# Patient Record
Sex: FEMALE | Race: BLACK OR AFRICAN AMERICAN | NOT HISPANIC OR LATINO
[De-identification: names, ages, dates, MRNs, and addresses within clinical notes are randomized per-mention and may not be internally consistent; named-entity substitution may affect disease eponyms.]

---

## 2018-10-25 PROBLEM — Z00.00 ENCOUNTER FOR PREVENTIVE HEALTH EXAMINATION: Status: ACTIVE | Noted: 2018-10-25

## 2018-10-29 ENCOUNTER — NON-APPOINTMENT (OUTPATIENT)
Age: 68
End: 2018-10-29

## 2018-10-29 ENCOUNTER — APPOINTMENT (OUTPATIENT)
Dept: CARDIOLOGY | Facility: CLINIC | Age: 68
End: 2018-10-29
Payer: MEDICARE

## 2018-10-29 VITALS
BODY MASS INDEX: 24.66 KG/M2 | OXYGEN SATURATION: 99 % | WEIGHT: 134 LBS | DIASTOLIC BLOOD PRESSURE: 68 MMHG | HEIGHT: 62 IN | HEART RATE: 73 BPM | SYSTOLIC BLOOD PRESSURE: 118 MMHG

## 2018-10-29 DIAGNOSIS — Z01.810 ENCOUNTER FOR PREPROCEDURAL CARDIOVASCULAR EXAMINATION: ICD-10-CM

## 2018-10-29 DIAGNOSIS — Z87.39 PERSONAL HISTORY OF OTHER DISEASES OF THE MUSCULOSKELETAL SYSTEM AND CONNECTIVE TISSUE: ICD-10-CM

## 2018-10-29 DIAGNOSIS — Z86.79 PERSONAL HISTORY OF OTHER DISEASES OF THE CIRCULATORY SYSTEM: ICD-10-CM

## 2018-10-29 PROCEDURE — 99204 OFFICE O/P NEW MOD 45 MIN: CPT

## 2018-10-29 PROCEDURE — 93000 ELECTROCARDIOGRAM COMPLETE: CPT

## 2018-10-29 PROCEDURE — 93306 TTE W/DOPPLER COMPLETE: CPT

## 2018-11-02 ENCOUNTER — OUTPATIENT (OUTPATIENT)
Dept: OUTPATIENT SERVICES | Facility: HOSPITAL | Age: 68
LOS: 1 days | End: 2018-11-02

## 2018-11-13 ENCOUNTER — OUTPATIENT (OUTPATIENT)
Dept: OUTPATIENT SERVICES | Facility: HOSPITAL | Age: 68
LOS: 1 days | End: 2018-11-13

## 2018-11-13 ENCOUNTER — INPATIENT (INPATIENT)
Facility: HOSPITAL | Age: 68
LOS: 6 days | Discharge: HOME | End: 2018-11-20
Payer: MEDICARE

## 2018-11-13 PROCEDURE — 72170 X-RAY EXAM OF PELVIS: CPT | Mod: 26

## 2018-11-14 ENCOUNTER — OUTPATIENT (OUTPATIENT)
Dept: OUTPATIENT SERVICES | Facility: HOSPITAL | Age: 68
LOS: 1 days | End: 2018-11-14

## 2018-11-15 ENCOUNTER — OUTPATIENT (OUTPATIENT)
Dept: OUTPATIENT SERVICES | Facility: HOSPITAL | Age: 68
LOS: 1 days | End: 2018-11-15

## 2018-11-16 ENCOUNTER — OUTPATIENT (OUTPATIENT)
Dept: OUTPATIENT SERVICES | Facility: HOSPITAL | Age: 68
LOS: 1 days | End: 2018-11-16

## 2018-11-19 ENCOUNTER — OUTPATIENT (OUTPATIENT)
Dept: OUTPATIENT SERVICES | Facility: HOSPITAL | Age: 68
LOS: 1 days | End: 2018-11-19

## 2018-11-29 ENCOUNTER — OUTPATIENT (OUTPATIENT)
Dept: OUTPATIENT SERVICES | Facility: HOSPITAL | Age: 68
LOS: 1 days | End: 2018-11-29
Payer: MEDICARE

## 2018-11-29 PROCEDURE — 93971 EXTREMITY STUDY: CPT | Mod: 26,RT

## 2020-10-26 ENCOUNTER — APPOINTMENT (OUTPATIENT)
Dept: MAMMOGRAPHY | Facility: CLINIC | Age: 70
End: 2020-10-26
Payer: MEDICARE

## 2020-10-26 PROCEDURE — 77063 BREAST TOMOSYNTHESIS BI: CPT

## 2020-10-26 PROCEDURE — 77067 SCR MAMMO BI INCL CAD: CPT

## 2020-10-26 PROCEDURE — 99072 ADDL SUPL MATRL&STAF TM PHE: CPT

## 2021-01-06 ENCOUNTER — APPOINTMENT (OUTPATIENT)
Dept: RADIOLOGY | Facility: CLINIC | Age: 71
End: 2021-01-06
Payer: MEDICARE

## 2021-01-06 PROCEDURE — 77080 DXA BONE DENSITY AXIAL: CPT

## 2021-12-29 ENCOUNTER — APPOINTMENT (OUTPATIENT)
Dept: MAMMOGRAPHY | Facility: CLINIC | Age: 71
End: 2021-12-29
Payer: MEDICARE

## 2021-12-29 PROCEDURE — 77067 SCR MAMMO BI INCL CAD: CPT

## 2021-12-29 PROCEDURE — 77063 BREAST TOMOSYNTHESIS BI: CPT

## 2022-01-21 ENCOUNTER — APPOINTMENT (OUTPATIENT)
Dept: MAMMOGRAPHY | Facility: CLINIC | Age: 72
End: 2022-01-21
Payer: COMMERCIAL

## 2022-01-21 ENCOUNTER — APPOINTMENT (OUTPATIENT)
Dept: ULTRASOUND IMAGING | Facility: CLINIC | Age: 72
End: 2022-01-21

## 2022-01-21 PROCEDURE — G0279: CPT | Mod: RT

## 2022-01-21 PROCEDURE — 77065 DX MAMMO INCL CAD UNI: CPT | Mod: RT

## 2022-01-21 PROCEDURE — 76642 ULTRASOUND BREAST LIMITED: CPT | Mod: RT

## 2022-06-17 ENCOUNTER — APPOINTMENT (OUTPATIENT)
Dept: MAMMOGRAPHY | Facility: CLINIC | Age: 72
End: 2022-06-17
Payer: MEDICARE

## 2022-06-17 ENCOUNTER — APPOINTMENT (OUTPATIENT)
Dept: ULTRASOUND IMAGING | Facility: CLINIC | Age: 72
End: 2022-06-17

## 2022-06-17 PROCEDURE — 77065 DX MAMMO INCL CAD UNI: CPT | Mod: RT

## 2022-06-17 PROCEDURE — 76642 ULTRASOUND BREAST LIMITED: CPT | Mod: RT

## 2022-06-17 PROCEDURE — G0279: CPT | Mod: RT

## 2022-11-03 RX ORDER — PSYLLIUM HUSK 0.4 G
CAPSULE ORAL
Refills: 0 | Status: ACTIVE | COMMUNITY

## 2022-11-03 RX ORDER — UBIDECARENONE/VIT E ACET 100MG-5
1000 CAPSULE ORAL
Refills: 0 | Status: ACTIVE | COMMUNITY

## 2022-11-03 RX ORDER — ASPIRIN ENTERIC COATED TABLETS 81 MG 81 MG/1
81 TABLET, DELAYED RELEASE ORAL DAILY
Qty: 30 | Refills: 0 | Status: ACTIVE | COMMUNITY

## 2022-11-03 RX ORDER — LATANOPROST/PF 0.005 %
0.01 DROPS OPHTHALMIC (EYE) DAILY
Refills: 0 | Status: ACTIVE | COMMUNITY

## 2022-11-03 RX ORDER — MULTIVIT-MIN/FOLIC/VIT K/LYCOP 400-300MCG
1000 TABLET ORAL DAILY
Refills: 0 | Status: ACTIVE | COMMUNITY

## 2022-11-22 ENCOUNTER — OFFICE (OUTPATIENT)
Dept: URBAN - METROPOLITAN AREA CLINIC 38 | Facility: CLINIC | Age: 72
Setting detail: OPHTHALMOLOGY
End: 2022-11-22
Payer: MEDICARE

## 2022-11-22 DIAGNOSIS — H01.001: ICD-10-CM

## 2022-11-22 DIAGNOSIS — H16.223: ICD-10-CM

## 2022-11-22 DIAGNOSIS — H40.1131: ICD-10-CM

## 2022-11-22 DIAGNOSIS — H25.13: ICD-10-CM

## 2022-11-22 DIAGNOSIS — H01.004: ICD-10-CM

## 2022-11-22 PROCEDURE — 99214 OFFICE O/P EST MOD 30 MIN: CPT | Performed by: OPHTHALMOLOGY

## 2022-11-22 ASSESSMENT — KERATOMETRY
OS_K1POWER_DIOPTERS: 42.25
METHOD_AUTO_MANUAL: AUTO
OD_K2POWER_DIOPTERS: 43.25
OS_K2POWER_DIOPTERS: 42.75
OS_AXISANGLE_DEGREES: 155
OD_K1POWER_DIOPTERS: 41.50
OD_AXISANGLE_DEGREES: 011

## 2022-11-22 ASSESSMENT — REFRACTION_CURRENTRX
OS_CYLINDER: -1.75
OS_OVR_VA: 20/
OS_AXIS: 057
OD_OVR_VA: 20/
OS_ADD: +2.00
OS_CYLINDER: -1.25
OD_OVR_VA: 20/
OD_SPHERE: +0.75
OD_CYLINDER: -3.00
OS_SPHERE: +0.75
OD_VPRISM_DIRECTION: PROGS
OS_SPHERE: +1.75
OS_VPRISM_DIRECTION: PROGS
OS_VPRISM_DIRECTION: PROGS
OD_AXIS: 108
OD_ADD: +2.25
OS_ADD: +2.25
OD_SPHERE: +1.00
OS_AXIS: 064
OD_CYLINDER: -2.50
OD_AXIS: 115
OD_ADD: +2.00
OS_OVR_VA: 20/
OD_VPRISM_DIRECTION: PROGS

## 2022-11-22 ASSESSMENT — REFRACTION_AUTOREFRACTION
OD_CYLINDER: -3.50
OS_AXIS: 079
OD_SPHERE: +1.50
OD_AXIS: 097
OS_SPHERE: +1.50
OS_CYLINDER: -1.75

## 2022-11-22 ASSESSMENT — VISUAL ACUITY
OD_BCVA: 20/20-2
OS_BCVA: 20/20

## 2022-11-22 ASSESSMENT — REFRACTION_MANIFEST
OD_VA2: 20/25(J1)
OS_ADD: +2.50
OD_AXIS: 100
OS_CYLINDER: -1.00
OS_SPHERE: +1.00
OU_VA: 20/20
OD_VA1: 20/20-
OD_CYLINDER: -3.00
OD_ADD: +2.50
OS_VA1: 20/25
OS_AXIS: 060
OS_VA2: 20/25(J1)
OD_SPHERE: +0.75

## 2022-11-22 ASSESSMENT — AXIALLENGTH_DERIVED
OS_AL: 23.7164
OS_AL: 23.7657
OD_AL: 24.1138
OD_AL: 24.3191

## 2022-11-22 ASSESSMENT — TONOMETRY
OD_IOP_MMHG: 16
OS_IOP_MMHG: 16

## 2022-11-22 ASSESSMENT — LID EXAM ASSESSMENTS
OS_BLEPHARITIS: T 1+
OD_BLEPHARITIS: T 1+

## 2022-11-22 ASSESSMENT — CONFRONTATIONAL VISUAL FIELD TEST (CVF)
OD_FINDINGS: FULL
OS_FINDINGS: FULL

## 2022-11-22 ASSESSMENT — SPHEQUIV_DERIVED
OD_SPHEQUIV: -0.75
OD_SPHEQUIV: -0.25
OS_SPHEQUIV: 0.5
OS_SPHEQUIV: 0.625

## 2022-11-22 ASSESSMENT — SUPERFICIAL PUNCTATE KERATITIS (SPK)
OS_SPK: T
OD_SPK: T

## 2022-11-30 ENCOUNTER — APPOINTMENT (OUTPATIENT)
Dept: ENDOCRINOLOGY | Facility: CLINIC | Age: 72
End: 2022-11-30

## 2022-11-30 VITALS
SYSTOLIC BLOOD PRESSURE: 108 MMHG | HEIGHT: 62 IN | WEIGHT: 134 LBS | DIASTOLIC BLOOD PRESSURE: 66 MMHG | BODY MASS INDEX: 24.66 KG/M2 | OXYGEN SATURATION: 99 % | HEART RATE: 58 BPM | TEMPERATURE: 98.4 F

## 2022-11-30 DIAGNOSIS — Z87.39 PERSONAL HISTORY OF OTHER DISEASES OF THE MUSCULOSKELETAL SYSTEM AND CONNECTIVE TISSUE: ICD-10-CM

## 2022-11-30 DIAGNOSIS — Z00.00 ENCOUNTER FOR GENERAL ADULT MEDICAL EXAMINATION W/OUT ABNORMAL FINDINGS: ICD-10-CM

## 2022-11-30 DIAGNOSIS — Z86.79 PERSONAL HISTORY OF OTHER DISEASES OF THE CIRCULATORY SYSTEM: ICD-10-CM

## 2022-11-30 DIAGNOSIS — I10 ESSENTIAL (PRIMARY) HYPERTENSION: ICD-10-CM

## 2022-11-30 PROCEDURE — 99213 OFFICE O/P EST LOW 20 MIN: CPT

## 2022-11-30 NOTE — ASSESSMENT
[FreeTextEntry1] : MIDDLE  AGED BF WITH H/X OF OSTEOPOROSIS,OFF PROLIA ,LAST BMD WITH SIG IMPROVEMENT,HTN CONTROLLED ON MED,AND LIPIDS .TOLERATING MED,NO H.X OF RECENT FALL OR  FX. .REC:\par 1.PT TO REPEAT HER COMPLETE BLOOD ANALYSIS.\par 2 .IF STABLE ,WILL CONTINUE CURRENT MED.\par CONTINUE DIET,EXERCISE AND EDWARD SUPPLEMENTS WITH VIT D ON A DAILY BASIS..PLAN D/W THE PT.\par F/U IN 4 MONTHS.

## 2022-11-30 NOTE — PHYSICAL EXAM
[Alert] : alert [Well Nourished] : well nourished [Normal Sclera/Conjunctiva] : normal sclera/conjunctiva [No Neck Mass] : no neck mass was observed [Clear to Auscultation] : lungs were clear to auscultation bilaterally [Normal Rate] : heart rate was normal [Carotids Normal] : carotid pulses were normal with no bruits [Pedal Pulses Normal] : the pedal pulses are present [No Varicosities] : there were no varicosital changes [Not Tender] : non-tender [Not Distended] : not distended [Soft] : abdomen soft [Normal Supraclavicular Nodes] : no supraclavicular lymphadenopathy [Normal Anterior Cervical Nodes] : no anterior cervical lymphadenopathy [Normal Posterior Cervical Nodes] : no posterior cervical lymphadenopathy [No CVA Tenderness] : no ~M costovertebral angle tenderness [No Stigmata of Cushings Syndrome] : no stigmata of Cushings Syndrome [No Rash] : no rash [No Motor Deficits] : the motor exam was normal [Normal Reflexes] : deep tendon reflexes were 2+ and symmetric [No Tremors] : no tremors [Oriented x3] : oriented to person, place, and time [de-identified] : DEFERRRED [FreeTextEntry1] : DEFERRED [de-identified] : DEFERRED

## 2022-11-30 NOTE — HISTORY OF PRESENT ILLNESS
[FreeTextEntry1] : yOUNG FEMALE WITH H/X OF HTN,HLD AND OSTEOPOROSISFOR F/U.NO SIG COMPLAINTS,COMPLIANT WITH DIET AND EXERCISE.ROS IS NEGATIVE.

## 2022-11-30 NOTE — PHYSICAL EXAM
[Alert] : alert [Well Nourished] : well nourished [Normal Sclera/Conjunctiva] : normal sclera/conjunctiva [No Neck Mass] : no neck mass was observed [Clear to Auscultation] : lungs were clear to auscultation bilaterally [Normal Rate] : heart rate was normal [Carotids Normal] : carotid pulses were normal with no bruits [Pedal Pulses Normal] : the pedal pulses are present [No Varicosities] : there were no varicosital changes [Not Tender] : non-tender [Not Distended] : not distended [Soft] : abdomen soft [Normal Supraclavicular Nodes] : no supraclavicular lymphadenopathy [Normal Anterior Cervical Nodes] : no anterior cervical lymphadenopathy [Normal Posterior Cervical Nodes] : no posterior cervical lymphadenopathy [No CVA Tenderness] : no ~M costovertebral angle tenderness [No Stigmata of Cushings Syndrome] : no stigmata of Cushings Syndrome [No Rash] : no rash [No Motor Deficits] : the motor exam was normal [Normal Reflexes] : deep tendon reflexes were 2+ and symmetric [No Tremors] : no tremors [Oriented x3] : oriented to person, place, and time [de-identified] : DEFERRRED [FreeTextEntry1] : DEFERRED [de-identified] : DEFERRED

## 2023-03-22 ENCOUNTER — OFFICE (OUTPATIENT)
Dept: URBAN - METROPOLITAN AREA CLINIC 38 | Facility: CLINIC | Age: 73
Setting detail: OPHTHALMOLOGY
End: 2023-03-22
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
## 2023-03-22 PROCEDURE — NO SHOW FE NO SHOW FEE: Performed by: OPHTHALMOLOGY

## 2023-04-21 PROBLEM — H52.4 PRESBYOPIA ; BOTH EYES: Status: ACTIVE | Noted: 2023-04-21

## 2023-04-24 ENCOUNTER — APPOINTMENT (OUTPATIENT)
Dept: ULTRASOUND IMAGING | Facility: CLINIC | Age: 73
End: 2023-04-24
Payer: MEDICARE

## 2023-04-24 ENCOUNTER — APPOINTMENT (OUTPATIENT)
Dept: ENDOCRINOLOGY | Facility: CLINIC | Age: 73
End: 2023-04-24
Payer: MEDICARE

## 2023-04-24 ENCOUNTER — RESULT REVIEW (OUTPATIENT)
Age: 73
End: 2023-04-24

## 2023-04-24 VITALS
RESPIRATION RATE: 14 BRPM | SYSTOLIC BLOOD PRESSURE: 120 MMHG | DIASTOLIC BLOOD PRESSURE: 72 MMHG | TEMPERATURE: 97.7 F | WEIGHT: 130 LBS | HEIGHT: 62 IN | BODY MASS INDEX: 23.92 KG/M2 | OXYGEN SATURATION: 97 % | HEART RATE: 89 BPM

## 2023-04-24 DIAGNOSIS — R22.1 LOCALIZED SWELLING, MASS AND LUMP, NECK: ICD-10-CM

## 2023-04-24 DIAGNOSIS — M81.8 OTHER OSTEOPOROSIS W/OUT CURRENT PATHOLOGICAL FRACTURE: ICD-10-CM

## 2023-04-24 PROCEDURE — 99214 OFFICE O/P EST MOD 30 MIN: CPT

## 2023-04-24 PROCEDURE — 76536 US EXAM OF HEAD AND NECK: CPT

## 2023-04-24 RX ORDER — AMLODIPINE BESYLATE 5 MG/1
5 TABLET ORAL DAILY
Refills: 0 | Status: DISCONTINUED | COMMUNITY
End: 2023-04-24

## 2023-04-24 RX ORDER — DENOSUMAB 60 MG/ML
60 INJECTION SUBCUTANEOUS
Refills: 0 | Status: DISCONTINUED | COMMUNITY
End: 2023-04-24

## 2023-04-24 RX ORDER — CHROMIUM 200 MCG
TABLET ORAL
Refills: 0 | Status: DISCONTINUED | COMMUNITY
End: 2023-04-24

## 2023-04-24 RX ORDER — ASPIRIN 81 MG
81 TABLET, DELAYED RELEASE (ENTERIC COATED) ORAL DAILY
Refills: 0 | Status: DISCONTINUED | COMMUNITY
End: 2023-04-24

## 2023-04-24 RX ORDER — BISOPROLOL FUMARATE AND HYDROCHLOROTHIAZIDE 5; 6.25 MG/1; MG/1
5-6.25 TABLET, FILM COATED ORAL DAILY
Refills: 0 | Status: DISCONTINUED | COMMUNITY
End: 2023-04-24

## 2023-04-24 RX ORDER — LATANOPROST/PF 0.005 %
0.01 DROPS OPHTHALMIC (EYE)
Refills: 0 | Status: DISCONTINUED | COMMUNITY
End: 2023-04-24

## 2023-04-24 RX ORDER — AMLODIPINE BESYLATE 2.5 MG/1
2.5 TABLET ORAL
Qty: 90 | Refills: 1 | Status: DISCONTINUED | COMMUNITY
End: 2023-04-24

## 2023-04-24 RX ORDER — MULTIVIT-MIN/IRON/FOLIC ACID/K 18-600-40
CAPSULE ORAL
Refills: 0 | Status: DISCONTINUED | COMMUNITY
End: 2023-04-24

## 2023-04-24 RX ORDER — PRAVASTATIN SODIUM 20 MG/1
20 TABLET ORAL DAILY
Refills: 0 | Status: DISCONTINUED | COMMUNITY
End: 2023-04-24

## 2023-04-24 RX ORDER — PSYLLIUM HUSK 0.4 G
CAPSULE ORAL
Refills: 0 | Status: DISCONTINUED | COMMUNITY
End: 2023-04-24

## 2023-04-24 RX ORDER — FOSINOPRIL SODIUM 10 MG/1
10 TABLET ORAL DAILY
Refills: 0 | Status: DISCONTINUED | COMMUNITY
End: 2023-04-24

## 2023-04-24 NOTE — PHYSICAL EXAM
[de-identified] : Right upper pole thyroid mass approximately 2 x 1 cm in size well defined and tender to touch.  No associated local erythema.

## 2023-04-24 NOTE — HISTORY OF PRESENT ILLNESS
[FreeTextEntry1] : 72-year-old -American female with a past medical history of hypertension, hyperlipidemia and osteoporosis presents with a main complaint of acute pain on the right side of the neck close to the thyroid gland.  She claimed that it was firm and tender but without any local discharge.  She has been experiencing difficulty in swallowing but she denies any palpitations chest pains or shortness of breath.  There is no history of any fever or chills.  She denies any recent oropharyngeal infections.

## 2023-05-10 ENCOUNTER — APPOINTMENT (OUTPATIENT)
Dept: RADIOLOGY | Facility: CLINIC | Age: 73
End: 2023-05-10
Payer: MEDICARE

## 2023-05-10 PROCEDURE — 77080 DXA BONE DENSITY AXIAL: CPT

## 2023-05-18 ENCOUNTER — OFFICE (OUTPATIENT)
Dept: URBAN - METROPOLITAN AREA CLINIC 38 | Facility: CLINIC | Age: 73
Setting detail: OPHTHALMOLOGY
End: 2023-05-18
Payer: MEDICARE

## 2023-05-18 DIAGNOSIS — H40.1131: ICD-10-CM

## 2023-05-18 DIAGNOSIS — H25.13: ICD-10-CM

## 2023-05-18 PROCEDURE — 99213 OFFICE O/P EST LOW 20 MIN: CPT | Performed by: OPHTHALMOLOGY

## 2023-05-18 ASSESSMENT — AXIALLENGTH_DERIVED
OD_AL: 24.3191
OD_AL: 24.0631
OS_AL: 23.7685
OS_AL: 23.7192

## 2023-05-18 ASSESSMENT — REFRACTION_CURRENTRX
OS_SPHERE: +1.75
OD_VPRISM_DIRECTION: PROGS
OD_ADD: +2.00
OS_VPRISM_DIRECTION: PROGS
OD_OVR_VA: 20/
OS_CYLINDER: -1.75
OD_SPHERE: +1.00
OD_VPRISM_DIRECTION: PROGS
OS_OVR_VA: 20/
OD_CYLINDER: -2.50
OD_OVR_VA: 20/
OS_AXIS: 057
OD_CYLINDER: -3.00
OS_AXIS: 064
OD_SPHERE: +0.75
OS_ADD: +2.25
OS_SPHERE: +0.75
OS_VPRISM_DIRECTION: PROGS
OS_OVR_VA: 20/
OD_AXIS: 108
OS_CYLINDER: -1.25
OD_ADD: +2.25
OS_ADD: +2.00
OD_AXIS: 115

## 2023-05-18 ASSESSMENT — REFRACTION_MANIFEST
OU_VA: 20/20
OS_CYLINDER: -1.00
OS_VA2: 20/25(J1)
OD_CYLINDER: -3.00
OS_VA1: 20/25
OS_ADD: +2.50
OD_VA1: 20/20-
OD_AXIS: 100
OD_ADD: +2.50
OD_VA2: 20/25(J1)
OD_SPHERE: +0.75
OS_SPHERE: +1.00
OS_AXIS: 060

## 2023-05-18 ASSESSMENT — KERATOMETRY
OD_AXISANGLE_DEGREES: 013
OS_K2POWER_DIOPTERS: 43.00
OS_K1POWER_DIOPTERS: 42.25
OD_K1POWER_DIOPTERS: 41.50
OS_AXISANGLE_DEGREES: 165
OD_K2POWER_DIOPTERS: 43.25
METHOD_AUTO_MANUAL: AUTO

## 2023-05-18 ASSESSMENT — SPHEQUIV_DERIVED
OS_SPHEQUIV: 0.375
OS_SPHEQUIV: 0.5
OD_SPHEQUIV: -0.75
OD_SPHEQUIV: -0.125

## 2023-05-18 ASSESSMENT — CONFRONTATIONAL VISUAL FIELD TEST (CVF)
OS_FINDINGS: FULL
OD_FINDINGS: FULL

## 2023-05-18 ASSESSMENT — REFRACTION_AUTOREFRACTION
OD_CYLINDER: -3.25
OS_SPHERE: +1.25
OD_SPHERE: +1.50
OS_AXIS: 079
OD_AXIS: 097
OS_CYLINDER: -1.75

## 2023-05-18 ASSESSMENT — VISUAL ACUITY
OD_BCVA: 20/30-1
OS_BCVA: 20/20

## 2023-05-18 ASSESSMENT — TONOMETRY
OD_IOP_MMHG: 16
OS_IOP_MMHG: 16

## 2023-10-13 PROBLEM — H52.4 PRESBYOPIA ; BOTH EYES: Status: ACTIVE | Noted: 2023-10-13

## 2023-11-16 ENCOUNTER — OFFICE (OUTPATIENT)
Dept: URBAN - METROPOLITAN AREA CLINIC 38 | Facility: CLINIC | Age: 73
Setting detail: OPHTHALMOLOGY
End: 2023-11-16
Payer: MEDICARE

## 2023-11-16 DIAGNOSIS — H43.393: ICD-10-CM

## 2023-11-16 DIAGNOSIS — H52.4: ICD-10-CM

## 2023-11-16 DIAGNOSIS — H40.1131: ICD-10-CM

## 2023-11-16 DIAGNOSIS — H25.13: ICD-10-CM

## 2023-11-16 DIAGNOSIS — H43.813: ICD-10-CM

## 2023-11-16 PROCEDURE — 92083 EXTENDED VISUAL FIELD XM: CPT | Performed by: OPHTHALMOLOGY

## 2023-11-16 PROCEDURE — 92014 COMPRE OPH EXAM EST PT 1/>: CPT | Performed by: OPHTHALMOLOGY

## 2023-11-16 PROCEDURE — 92015 DETERMINE REFRACTIVE STATE: CPT | Performed by: OPHTHALMOLOGY

## 2023-11-16 PROCEDURE — 92133 CPTRZD OPH DX IMG PST SGM ON: CPT | Performed by: OPHTHALMOLOGY

## 2023-11-16 ASSESSMENT — REFRACTION_CURRENTRX
OD_VPRISM_DIRECTION: PROGS
OD_SPHERE: +0.75
OS_VPRISM_DIRECTION: PROGS
OS_AXIS: 076
OS_OVR_VA: 20/
OS_AXIS: 064
OD_OVR_VA: 20/
OD_AXIS: 115
OS_SPHERE: +0.50
OD_VPRISM_DIRECTION: PROGS
OD_CYLINDER: -2.50
OD_CYLINDER: -3.00
OS_OVR_VA: 20/
OD_SPHERE: +1.00
OS_CYLINDER: -1.00
OS_ADD: +2.50
OS_CYLINDER: -1.75
OS_ADD: +2.00
OS_SPHERE: +1.75
OS_VPRISM_DIRECTION: PROGS
OD_ADD: +2.00
OD_AXIS: 111
OD_ADD: +2.50
OD_OVR_VA: 20/

## 2023-11-16 ASSESSMENT — REFRACTION_AUTOREFRACTION
OS_AXIS: 078
OS_SPHERE: +1.25
OD_AXIS: 095
OD_CYLINDER: -3.00
OD_SPHERE: +1.00
OS_CYLINDER: -1.50

## 2023-11-16 ASSESSMENT — REFRACTION_MANIFEST
OD_VA1: 20/20-2
OD_ADD: +2.50
OS_CYLINDER: -1.00
OD_AXIS: 095
OS_SPHERE: +0.75
OS_VA1: 20/20-2
OD_AXIS: 095
OS_AXIS: 080
OS_SPHERE: +0.75
OS_VA2: 20/20(J1+)
OU_VA: 20/20-
OD_VA1: 20/20-2
OU_VA: 20/20-
OD_ADD: +2.50
OD_VA2: 20/20(J1+)
OS_ADD: +2.50
OS_CYLINDER: -1.00
OS_VA2: 20/20(J1+)
OD_CYLINDER: -2.25
OD_VA2: 20/20(J1+)
OS_AXIS: 080
OS_ADD: +2.50
OS_VA1: 20/20-2
OD_CYLINDER: -2.25
OD_SPHERE: +0.25
OD_SPHERE: +0.25

## 2023-11-16 ASSESSMENT — SPHEQUIV_DERIVED
OS_SPHEQUIV: 0.25
OD_SPHEQUIV: -0.875
OS_SPHEQUIV: 0.5
OD_SPHEQUIV: -0.5
OS_SPHEQUIV: 0.25
OD_SPHEQUIV: -0.875

## 2023-11-16 ASSESSMENT — CONFRONTATIONAL VISUAL FIELD TEST (CVF)
OD_FINDINGS: FULL
OS_FINDINGS: FULL

## 2023-12-21 ENCOUNTER — APPOINTMENT (OUTPATIENT)
Dept: MAMMOGRAPHY | Facility: CLINIC | Age: 73
End: 2023-12-21
Payer: MEDICARE

## 2023-12-21 ENCOUNTER — APPOINTMENT (OUTPATIENT)
Dept: ULTRASOUND IMAGING | Facility: CLINIC | Age: 73
End: 2023-12-21

## 2023-12-21 PROCEDURE — 76641 ULTRASOUND BREAST COMPLETE: CPT | Mod: 50

## 2023-12-21 PROCEDURE — 77063 BREAST TOMOSYNTHESIS BI: CPT

## 2023-12-21 PROCEDURE — 77067 SCR MAMMO BI INCL CAD: CPT

## 2024-01-15 ENCOUNTER — APPOINTMENT (OUTPATIENT)
Dept: ENDOCRINOLOGY | Facility: CLINIC | Age: 74
End: 2024-01-15

## 2024-04-19 PROBLEM — H52.4 PRESBYOPIA ; BOTH EYES: Status: ACTIVE | Noted: 2024-04-19

## 2024-05-16 ENCOUNTER — OFFICE (OUTPATIENT)
Dept: URBAN - METROPOLITAN AREA CLINIC 38 | Facility: CLINIC | Age: 74
Setting detail: OPHTHALMOLOGY
End: 2024-05-16
Payer: MEDICARE

## 2024-05-16 DIAGNOSIS — H25.13: ICD-10-CM

## 2024-05-16 DIAGNOSIS — H43.393: ICD-10-CM

## 2024-05-16 DIAGNOSIS — H40.1131: ICD-10-CM

## 2024-05-16 DIAGNOSIS — H43.813: ICD-10-CM

## 2024-05-16 PROCEDURE — 99213 OFFICE O/P EST LOW 20 MIN: CPT | Performed by: OPHTHALMOLOGY

## 2024-05-16 ASSESSMENT — CONFRONTATIONAL VISUAL FIELD TEST (CVF)
OD_FINDINGS: FULL
OS_FINDINGS: FULL

## 2024-06-20 ENCOUNTER — APPOINTMENT (OUTPATIENT)
Dept: ULTRASOUND IMAGING | Facility: CLINIC | Age: 74
End: 2024-06-20
Payer: MEDICARE

## 2024-06-20 PROCEDURE — 93880 EXTRACRANIAL BILAT STUDY: CPT

## 2024-06-25 ENCOUNTER — APPOINTMENT (OUTPATIENT)
Dept: CARDIOLOGY | Facility: CLINIC | Age: 74
End: 2024-06-25
Payer: COMMERCIAL

## 2024-06-25 VITALS
HEART RATE: 67 BPM | DIASTOLIC BLOOD PRESSURE: 72 MMHG | OXYGEN SATURATION: 100 % | HEIGHT: 62 IN | BODY MASS INDEX: 24.66 KG/M2 | SYSTOLIC BLOOD PRESSURE: 130 MMHG | WEIGHT: 134 LBS

## 2024-06-25 VITALS — SYSTOLIC BLOOD PRESSURE: 128 MMHG | DIASTOLIC BLOOD PRESSURE: 68 MMHG

## 2024-06-25 DIAGNOSIS — I10 ESSENTIAL (PRIMARY) HYPERTENSION: ICD-10-CM

## 2024-06-25 DIAGNOSIS — R73.9 HYPERGLYCEMIA, UNSPECIFIED: ICD-10-CM

## 2024-06-25 DIAGNOSIS — E78.5 HYPERLIPIDEMIA, UNSPECIFIED: ICD-10-CM

## 2024-06-25 DIAGNOSIS — R53.83 OTHER MALAISE: ICD-10-CM

## 2024-06-25 DIAGNOSIS — R53.81 OTHER MALAISE: ICD-10-CM

## 2024-06-25 PROCEDURE — 93000 ELECTROCARDIOGRAM COMPLETE: CPT

## 2024-06-25 PROCEDURE — 99203 OFFICE O/P NEW LOW 30 MIN: CPT

## 2024-06-25 RX ORDER — EZETIMIBE 10 MG/1
10 TABLET ORAL DAILY
Refills: 0 | Status: ACTIVE | COMMUNITY

## 2024-06-25 RX ORDER — CARVEDILOL 12.5 MG/1
12.5 TABLET, FILM COATED ORAL TWICE DAILY
Refills: 0 | Status: ACTIVE | COMMUNITY

## 2024-06-25 RX ORDER — CHLORHEXIDINE GLUCONATE 4 %
LIQUID (ML) TOPICAL DAILY
Refills: 0 | Status: ACTIVE | COMMUNITY

## 2024-06-25 RX ORDER — BISOPROLOL FUMARATE AND HYDROCHLOROTHIAZIDE 5; 6.25 MG/1; MG/1
5-6.25 TABLET, FILM COATED ORAL DAILY
Qty: 90 | Refills: 3 | Status: DISCONTINUED | COMMUNITY
Start: 2022-11-03 | End: 2024-06-25

## 2024-06-25 RX ORDER — HYDROCHLOROTHIAZIDE 25 MG/1
25 TABLET ORAL DAILY
Refills: 0 | Status: ACTIVE | COMMUNITY

## 2024-06-25 RX ORDER — PRAVASTATIN SODIUM 40 MG/1
40 TABLET ORAL DAILY
Qty: 3 | Refills: 3 | Status: DISCONTINUED | COMMUNITY
Start: 2023-06-19 | End: 2024-06-25

## 2024-06-25 RX ORDER — FOSINOPRIL SODIUM 10 MG/1
10 TABLET ORAL DAILY
Qty: 90 | Refills: 3 | Status: DISCONTINUED | COMMUNITY
End: 2024-06-25

## 2024-06-25 RX ORDER — FOLIC ACID 1 MG/1
1 TABLET ORAL DAILY
Qty: 90 | Refills: 1 | Status: DISCONTINUED | COMMUNITY
End: 2024-06-25

## 2024-06-25 RX ORDER — PRAVASTATIN SODIUM 80 MG/1
80 TABLET ORAL DAILY
Refills: 0 | Status: ACTIVE | COMMUNITY

## 2024-06-25 RX ORDER — VALSARTAN 160 MG/1
160 TABLET, COATED ORAL DAILY
Refills: 0 | Status: ACTIVE | COMMUNITY

## 2024-08-19 ENCOUNTER — APPOINTMENT (OUTPATIENT)
Dept: CARDIOLOGY | Facility: CLINIC | Age: 74
End: 2024-08-19
Payer: MEDICARE

## 2024-08-19 ENCOUNTER — NON-APPOINTMENT (OUTPATIENT)
Age: 74
End: 2024-08-19

## 2024-08-19 PROCEDURE — 93015 CV STRESS TEST SUPVJ I&R: CPT

## 2024-08-19 PROCEDURE — 76376 3D RENDER W/INTRP POSTPROCES: CPT

## 2024-08-19 PROCEDURE — 93306 TTE W/DOPPLER COMPLETE: CPT

## 2024-08-20 ENCOUNTER — NON-APPOINTMENT (OUTPATIENT)
Age: 74
End: 2024-08-20

## 2024-08-21 ENCOUNTER — APPOINTMENT (OUTPATIENT)
Dept: CARDIOLOGY | Facility: CLINIC | Age: 74
End: 2024-08-21
Payer: MEDICARE

## 2024-08-21 VITALS
DIASTOLIC BLOOD PRESSURE: 82 MMHG | HEIGHT: 62 IN | WEIGHT: 132 LBS | BODY MASS INDEX: 24.29 KG/M2 | HEART RATE: 79 BPM | SYSTOLIC BLOOD PRESSURE: 146 MMHG | OXYGEN SATURATION: 100 %

## 2024-08-21 DIAGNOSIS — I10 ESSENTIAL (PRIMARY) HYPERTENSION: ICD-10-CM

## 2024-08-21 DIAGNOSIS — Z86.79 PERSONAL HISTORY OF OTHER DISEASES OF THE CIRCULATORY SYSTEM: ICD-10-CM

## 2024-08-21 DIAGNOSIS — R53.83 OTHER MALAISE: ICD-10-CM

## 2024-08-21 DIAGNOSIS — R53.81 OTHER MALAISE: ICD-10-CM

## 2024-08-21 DIAGNOSIS — R73.9 HYPERGLYCEMIA, UNSPECIFIED: ICD-10-CM

## 2024-08-21 DIAGNOSIS — Z87.39 PERSONAL HISTORY OF OTHER DISEASES OF THE MUSCULOSKELETAL SYSTEM AND CONNECTIVE TISSUE: ICD-10-CM

## 2024-08-21 DIAGNOSIS — E78.5 HYPERLIPIDEMIA, UNSPECIFIED: ICD-10-CM

## 2024-08-21 PROCEDURE — G2211 COMPLEX E/M VISIT ADD ON: CPT

## 2024-08-21 PROCEDURE — 99204 OFFICE O/P NEW MOD 45 MIN: CPT

## 2024-08-29 PROBLEM — Z86.79 HISTORY OF HYPERTENSION: Status: RESOLVED | Noted: 2018-10-29 | Resolved: 2024-08-29

## 2024-08-29 PROBLEM — Z87.39 HISTORY OF OSTEOPOROSIS: Status: RESOLVED | Noted: 2022-11-03 | Resolved: 2024-08-29

## 2024-08-29 PROBLEM — I10 BENIGN ESSENTIAL HYPERTENSION: Status: RESOLVED | Noted: 2022-11-03 | Resolved: 2024-08-29

## 2024-08-29 PROBLEM — Z87.39 HISTORY OF ARTHRITIS: Status: RESOLVED | Noted: 2022-11-03 | Resolved: 2024-08-29

## 2024-08-29 PROBLEM — Z87.39 HISTORY OF ARTHRITIS: Status: RESOLVED | Noted: 2018-10-29 | Resolved: 2024-08-29

## 2024-08-29 PROBLEM — Z86.79 HISTORY OF CORONARY ARTERY DISEASE: Status: RESOLVED | Noted: 2022-11-03 | Resolved: 2024-08-29

## 2024-08-29 NOTE — DISCUSSION/SUMMARY
[FreeTextEntry1] : Yoanna is a very pleasant 74-year-old female with a past medical history significant for hypertension, hyperlipidemia, and elevated blood sugar levels.  She currently complains of fatigue since starting blood pressure medications.  Hypertension: On beta-blocker with fatigue.  Discontinue carvedilol gradually with plan to discontinue in the next 2 or 3 days.  Increase valsartan to 360 mg daily.  Exercise tolerance test to evaluate for any exertional symptoms, EKG changes that may indicate ischemia, and to measure blood pressure while on all of her medications.  Low-sodium diet.  Echocardiogram to evaluate for LVH.  Hyperlipidemia: Tolerating pravastatin.  LDL 97.  No evidence of PAD or CAD.  Continue current dose.  Elevated glucose levels: A1c from September 2023 of 5.7.  Continue follow-up with endocrine.  Follow-up after testing to review results and for any further medication adjustments that may be necessary based on findings.  Aggressive risk factor modification.  Consider CCTA I spent 35 minutes with the patient including: 10 minutes preparing for the visit 15 minutes face to face. 1o minutes on documentation and coordination of care.

## 2024-08-29 NOTE — REASON FOR VISIT
[Hyperlipidemia] : hyperlipidemia [Hypertension] : hypertension [FreeTextEntry1] : 74-year-old female that presents today at the request of her PMD, Dr. Nazario, for cardiac evaluation.  She was last seen in our office in 2018 with Dr. Malone.  She has a past medical history significant for longstanding hypertension, elevated glucose, and hyperlipidemia. Patient is very active.  She attempts to walk at least 1 mile per day.  She denies any exertional symptoms to include chest pain, shortness of breath, or palpitations.  Also denies any PND, orthopnea, or edema. Patient's only complaint today is of fatigue.  States that since blood pressure medications were initiated, she has been experiencing more fatigue. She reports med compliance. EKG reveals normal sinus rhythm. Recent carotid ultrasound ordered by PMD was reviewed today.  No significant atherosclerosis noted. Here to review testing. LVEF on ECHO 63 % no evidence of significant structural heart disease. Good exercise tolerance with ischemic changes, asymptomatic.

## 2024-11-06 ENCOUNTER — APPOINTMENT (OUTPATIENT)
Dept: CARDIOLOGY | Facility: CLINIC | Age: 74
End: 2024-11-06
Payer: MEDICARE

## 2024-11-06 VITALS
HEART RATE: 87 BPM | DIASTOLIC BLOOD PRESSURE: 74 MMHG | SYSTOLIC BLOOD PRESSURE: 140 MMHG | BODY MASS INDEX: 24.88 KG/M2 | WEIGHT: 136 LBS | OXYGEN SATURATION: 97 %

## 2024-11-06 DIAGNOSIS — R73.9 HYPERGLYCEMIA, UNSPECIFIED: ICD-10-CM

## 2024-11-06 DIAGNOSIS — Z87.39 PERSONAL HISTORY OF OTHER DISEASES OF THE MUSCULOSKELETAL SYSTEM AND CONNECTIVE TISSUE: ICD-10-CM

## 2024-11-06 DIAGNOSIS — I25.9 CHRONIC ISCHEMIC HEART DISEASE, UNSPECIFIED: ICD-10-CM

## 2024-11-06 DIAGNOSIS — Z86.79 PERSONAL HISTORY OF OTHER DISEASES OF THE CIRCULATORY SYSTEM: ICD-10-CM

## 2024-11-06 DIAGNOSIS — I10 ESSENTIAL (PRIMARY) HYPERTENSION: ICD-10-CM

## 2024-11-06 DIAGNOSIS — E78.5 HYPERLIPIDEMIA, UNSPECIFIED: ICD-10-CM

## 2024-11-06 PROCEDURE — 99214 OFFICE O/P EST MOD 30 MIN: CPT

## 2024-11-06 PROCEDURE — G2211 COMPLEX E/M VISIT ADD ON: CPT

## 2024-11-06 RX ORDER — CARVEDILOL 3.12 MG/1
3.12 TABLET, FILM COATED ORAL TWICE DAILY
Refills: 0 | Status: ACTIVE | COMMUNITY

## 2024-11-11 PROBLEM — Z87.39 HISTORY OF ARTHRITIS: Status: RESOLVED | Noted: 2018-10-29 | Resolved: 2024-11-11

## 2024-11-11 PROBLEM — Z87.39 HISTORY OF ARTHRITIS: Status: RESOLVED | Noted: 2022-11-03 | Resolved: 2024-11-11

## 2024-11-11 PROBLEM — I10 BENIGN ESSENTIAL HYPERTENSION: Status: RESOLVED | Noted: 2022-11-03 | Resolved: 2024-11-11

## 2024-11-11 PROBLEM — Z86.79 HISTORY OF CORONARY ARTERY DISEASE: Status: RESOLVED | Noted: 2022-11-03 | Resolved: 2024-11-11

## 2024-11-11 PROBLEM — I25.9 CHEST PAIN DUE TO MYOCARDIAL ISCHEMIA, UNSPECIFIED ISCHEMIC CHEST PAIN TYPE: Status: ACTIVE | Noted: 2024-11-06

## 2024-11-11 PROBLEM — Z86.79 HISTORY OF HYPERTENSION: Status: RESOLVED | Noted: 2018-10-29 | Resolved: 2024-11-11

## 2024-11-11 PROBLEM — Z87.39 HISTORY OF OSTEOPOROSIS: Status: RESOLVED | Noted: 2022-11-03 | Resolved: 2024-11-11

## 2024-11-22 PROBLEM — H52.4 PRESBYOPIA ; BOTH EYES: Status: ACTIVE | Noted: 2024-11-22

## 2024-12-16 ENCOUNTER — OFFICE (OUTPATIENT)
Dept: URBAN - METROPOLITAN AREA CLINIC 38 | Facility: CLINIC | Age: 74
Setting detail: OPHTHALMOLOGY
End: 2024-12-16
Payer: MEDICARE

## 2024-12-16 DIAGNOSIS — H40.1131: ICD-10-CM

## 2024-12-16 DIAGNOSIS — H25.13: ICD-10-CM

## 2024-12-16 PROCEDURE — 92020 GONIOSCOPY: CPT | Performed by: STUDENT IN AN ORGANIZED HEALTH CARE EDUCATION/TRAINING PROGRAM

## 2024-12-16 PROCEDURE — 92083 EXTENDED VISUAL FIELD XM: CPT | Performed by: STUDENT IN AN ORGANIZED HEALTH CARE EDUCATION/TRAINING PROGRAM

## 2024-12-16 PROCEDURE — 92133 CPTRZD OPH DX IMG PST SGM ON: CPT | Performed by: STUDENT IN AN ORGANIZED HEALTH CARE EDUCATION/TRAINING PROGRAM

## 2024-12-16 PROCEDURE — 99213 OFFICE O/P EST LOW 20 MIN: CPT | Performed by: STUDENT IN AN ORGANIZED HEALTH CARE EDUCATION/TRAINING PROGRAM

## 2024-12-16 ASSESSMENT — REFRACTION_AUTOREFRACTION
OS_AXIS: 085
OS_CYLINDER: -1.75
OS_SPHERE: +1.50
OD_AXIS: 095
OD_SPHERE: +1.00
OD_CYLINDER: -3.00

## 2024-12-16 ASSESSMENT — KERATOMETRY
OS_K1POWER_DIOPTERS: 42.00
OD_AXISANGLE_DEGREES: 099
OS_K2POWER_DIOPTERS: 42.75
OD_K1POWER_DIOPTERS: 41.25
OD_K2POWER_DIOPTERS: 43.00
OS_AXISANGLE_DEGREES: 169
METHOD_AUTO_MANUAL: AUTO

## 2024-12-16 ASSESSMENT — REFRACTION_CURRENTRX
OD_SPHERE: +0.25
OD_AXIS: 097
OS_ADD: +2.50
OD_AXIS: 115
OS_AXIS: 080
OS_VPRISM_DIRECTION: PROGS
OS_OVR_VA: 20/
OD_ADD: +2.00
OD_ADD: +2.50
OS_CYLINDER: -1.00
OD_SPHERE: +1.00
OS_CYLINDER: -1.75
OS_OVR_VA: 20/
OD_VPRISM_DIRECTION: PROGS
OD_OVR_VA: 20/
OS_SPHERE: +1.75
OD_CYLINDER: -3.00
OD_OVR_VA: 20/
OS_ADD: +2.00
OS_VPRISM_DIRECTION: PROGS
OS_SPHERE: +0.75
OS_AXIS: 064
OD_CYLINDER: -2.25
OD_VPRISM_DIRECTION: PROGS

## 2024-12-16 ASSESSMENT — REFRACTION_MANIFEST
OS_SPHERE: +0.75
OS_VA2: 20/20(J1+)
OD_ADD: +2.50
OS_VA1: 20/20-2
OD_VA1: 20/20-2
OD_ADD: +2.50
OD_VA2: 20/20(J1+)
OD_CYLINDER: -2.25
OS_CYLINDER: -1.00
OS_CYLINDER: -1.00
OS_AXIS: 080
OS_VA2: 20/20(J1+)
OS_SPHERE: +0.75
OS_ADD: +2.50
OD_SPHERE: +0.25
OS_VA1: 20/20-2
OD_SPHERE: +0.25
OD_VA1: 20/20-2
OD_AXIS: 095
OS_ADD: +2.50
OU_VA: 20/20-
OS_AXIS: 080
OD_VA2: 20/20(J1+)
OD_CYLINDER: -2.25
OD_AXIS: 095
OU_VA: 20/20-

## 2024-12-16 ASSESSMENT — VISUAL ACUITY
OS_BCVA: 20/30-
OD_BCVA: 20/30-

## 2024-12-16 ASSESSMENT — CONFRONTATIONAL VISUAL FIELD TEST (CVF)
OS_FINDINGS: FULL
OD_FINDINGS: FULL

## 2024-12-31 ENCOUNTER — APPOINTMENT (OUTPATIENT)
Dept: MAMMOGRAPHY | Facility: CLINIC | Age: 74
End: 2024-12-31
Payer: MEDICARE

## 2024-12-31 PROCEDURE — 77067 SCR MAMMO BI INCL CAD: CPT

## 2024-12-31 PROCEDURE — 77063 BREAST TOMOSYNTHESIS BI: CPT

## 2025-02-07 ENCOUNTER — OFFICE (OUTPATIENT)
Dept: URBAN - METROPOLITAN AREA CLINIC 38 | Facility: CLINIC | Age: 75
Setting detail: OPHTHALMOLOGY
End: 2025-02-07
Payer: MEDICARE

## 2025-02-07 ENCOUNTER — RX ONLY (RX ONLY)
Age: 75
End: 2025-02-07

## 2025-02-07 DIAGNOSIS — H25.13: ICD-10-CM

## 2025-02-07 DIAGNOSIS — H02.822: ICD-10-CM

## 2025-02-07 DIAGNOSIS — H40.1131: ICD-10-CM

## 2025-02-07 PROCEDURE — 99213 OFFICE O/P EST LOW 20 MIN: CPT | Performed by: STUDENT IN AN ORGANIZED HEALTH CARE EDUCATION/TRAINING PROGRAM

## 2025-02-07 ASSESSMENT — REFRACTION_CURRENTRX
OD_SPHERE: +1.00
OD_ADD: +2.50
OD_ADD: +2.00
OS_AXIS: 064
OS_VPRISM_DIRECTION: PROGS
OD_OVR_VA: 20/
OS_ADD: +2.00
OS_AXIS: 080
OD_SPHERE: +0.25
OD_CYLINDER: -3.00
OS_VPRISM_DIRECTION: PROGS
OD_VPRISM_DIRECTION: PROGS
OS_CYLINDER: -1.00
OD_AXIS: 115
OS_ADD: +2.50
OD_AXIS: 097
OS_OVR_VA: 20/
OS_SPHERE: +0.75
OD_CYLINDER: -2.25
OS_CYLINDER: -1.75
OS_SPHERE: +1.75
OS_OVR_VA: 20/
OD_VPRISM_DIRECTION: PROGS
OD_OVR_VA: 20/

## 2025-02-07 ASSESSMENT — CONFRONTATIONAL VISUAL FIELD TEST (CVF)
OS_FINDINGS: FULL
OD_FINDINGS: FULL

## 2025-02-07 ASSESSMENT — KERATOMETRY
OD_K2POWER_DIOPTERS: 43.00
OD_AXISANGLE_DEGREES: 099
OD_K1POWER_DIOPTERS: 41.25
OS_K1POWER_DIOPTERS: 42.00
METHOD_AUTO_MANUAL: AUTO
OS_K2POWER_DIOPTERS: 42.75
OS_AXISANGLE_DEGREES: 163

## 2025-02-07 ASSESSMENT — REFRACTION_MANIFEST
OS_VA1: 20/20-2
OS_AXIS: 080
OS_VA2: 20/20(J1+)
OS_CYLINDER: -1.00
OD_VA2: 20/20(J1+)
OD_CYLINDER: -2.25
OD_AXIS: 095
OS_VA2: 20/20(J1+)
OD_SPHERE: +0.25
OS_SPHERE: +0.75
OD_CYLINDER: -2.25
OD_VA2: 20/20(J1+)
OD_ADD: +2.50
OS_AXIS: 080
OD_ADD: +2.50
OS_CYLINDER: -1.00
OU_VA: 20/20-
OD_VA1: 20/20-2
OS_ADD: +2.50
OS_VA1: 20/20-2
OS_ADD: +2.50
OD_SPHERE: +0.25
OD_VA1: 20/20-2
OD_AXIS: 095
OU_VA: 20/20-
OS_SPHERE: +0.75

## 2025-02-07 ASSESSMENT — REFRACTION_AUTOREFRACTION
OS_AXIS: 078
OS_SPHERE: +1.00
OD_CYLINDER: -3.00
OS_CYLINDER: -1.50
OD_AXIS: 095
OD_SPHERE: +1.25

## 2025-02-07 ASSESSMENT — TONOMETRY
OS_IOP_MMHG: 18
OD_IOP_MMHG: 19

## 2025-02-07 ASSESSMENT — VISUAL ACUITY
OD_BCVA: 20/30-
OS_BCVA: 20/30

## 2025-05-12 ENCOUNTER — APPOINTMENT (OUTPATIENT)
Dept: RADIOLOGY | Facility: CLINIC | Age: 75
End: 2025-05-12
Payer: MEDICARE

## 2025-05-12 PROCEDURE — 77080 DXA BONE DENSITY AXIAL: CPT

## 2025-05-23 PROBLEM — H52.4 PRESBYOPIA ; BOTH EYES: Status: ACTIVE | Noted: 2025-05-23

## 2025-05-29 PROBLEM — H35.033 HYPERTENSIVE RETINOPATHY; BOTH EYES: Status: ACTIVE | Noted: 2025-05-29

## 2025-06-23 ENCOUNTER — APPOINTMENT (OUTPATIENT)
Dept: OPHTHALMOLOGY | Facility: CLINIC | Age: 75
End: 2025-06-23

## 2025-06-23 PROCEDURE — 99204 OFFICE O/P NEW MOD 45 MIN: CPT

## 2025-06-23 PROCEDURE — 76514 ECHO EXAM OF EYE THICKNESS: CPT

## 2025-07-25 ENCOUNTER — APPOINTMENT (OUTPATIENT)
Dept: OPHTHALMOLOGY | Facility: CLINIC | Age: 75
End: 2025-07-25
Payer: MEDICARE

## 2025-07-25 PROCEDURE — 92133 CPTRZD OPH DX IMG PST SGM ON: CPT

## 2025-07-25 PROCEDURE — 92083 EXTENDED VISUAL FIELD XM: CPT

## 2025-08-28 ENCOUNTER — NON-APPOINTMENT (OUTPATIENT)
Age: 75
End: 2025-08-28

## 2025-08-28 ENCOUNTER — APPOINTMENT (OUTPATIENT)
Dept: OPHTHALMOLOGY | Facility: CLINIC | Age: 75
End: 2025-08-28
Payer: MEDICARE

## 2025-08-28 PROCEDURE — 92020 GONIOSCOPY: CPT

## 2025-08-28 PROCEDURE — 92012 INTRM OPH EXAM EST PATIENT: CPT
